# Patient Record
Sex: FEMALE | Race: ASIAN | NOT HISPANIC OR LATINO | Employment: UNEMPLOYED | URBAN - METROPOLITAN AREA
[De-identification: names, ages, dates, MRNs, and addresses within clinical notes are randomized per-mention and may not be internally consistent; named-entity substitution may affect disease eponyms.]

---

## 2019-01-24 ENCOUNTER — OFFICE VISIT (OUTPATIENT)
Dept: FAMILY MEDICINE CLINIC | Facility: CLINIC | Age: 34
End: 2019-01-24

## 2019-01-24 VITALS
HEIGHT: 64 IN | BODY MASS INDEX: 22.43 KG/M2 | HEART RATE: 75 BPM | OXYGEN SATURATION: 99 % | SYSTOLIC BLOOD PRESSURE: 120 MMHG | DIASTOLIC BLOOD PRESSURE: 88 MMHG | WEIGHT: 131.4 LBS | TEMPERATURE: 98.4 F

## 2019-01-24 DIAGNOSIS — Z76.89 ENCOUNTER TO ESTABLISH CARE: Primary | ICD-10-CM

## 2019-01-24 DIAGNOSIS — F40.243 ANXIETY WITH FLYING: ICD-10-CM

## 2019-01-24 DIAGNOSIS — Z00.00 HEALTHCARE MAINTENANCE: ICD-10-CM

## 2019-01-24 DIAGNOSIS — J32.0 MAXILLARY SINUSITIS, UNSPECIFIED CHRONICITY: ICD-10-CM

## 2019-01-24 LAB
ALBUMIN SERPL-MCNC: 4.2 G/DL (ref 3.5–5.2)
ALBUMIN/GLOB SERPL: 1.3 G/DL
ALP SERPL-CCNC: 36 U/L (ref 39–117)
ALT SERPL W P-5'-P-CCNC: 23 U/L (ref 1–33)
ANION GAP SERPL CALCULATED.3IONS-SCNC: 10.4 MMOL/L
AST SERPL-CCNC: 26 U/L (ref 1–32)
BILIRUB SERPL-MCNC: 0.4 MG/DL (ref 0.1–1.2)
BUN BLD-MCNC: 15 MG/DL (ref 6–20)
BUN/CREAT SERPL: 18.3 (ref 7–25)
CALCIUM SPEC-SCNC: 9.5 MG/DL (ref 8.6–10.5)
CHLORIDE SERPL-SCNC: 104 MMOL/L (ref 98–107)
CHOLEST SERPL-MCNC: 163 MG/DL (ref 0–200)
CO2 SERPL-SCNC: 25.6 MMOL/L (ref 22–29)
CREAT BLD-MCNC: 0.82 MG/DL (ref 0.57–1)
ERYTHROCYTE [DISTWIDTH] IN BLOOD BY AUTOMATED COUNT: 12.9 % (ref 4.5–15)
GFR SERPL CREATININE-BSD FRML MDRD: 79 ML/MIN/1.73
GFR SERPL CREATININE-BSD FRML MDRD: 96 ML/MIN/1.73
GLOBULIN UR ELPH-MCNC: 3.2 GM/DL
GLUCOSE BLD-MCNC: 80 MG/DL (ref 65–99)
HCT VFR BLD AUTO: 43.5 % (ref 31–42)
HDLC SERPL-MCNC: 62 MG/DL (ref 40–60)
HGB BLD-MCNC: 13.8 G/DL (ref 12–18)
LDLC SERPL CALC-MCNC: 79 MG/DL (ref 0–100)
LDLC/HDLC SERPL: 1.27 {RATIO}
LYMPHOCYTES # BLD AUTO: 2.1 10*3/MM3 (ref 1.2–3.4)
LYMPHOCYTES NFR BLD AUTO: 24.5 % (ref 21–51)
MCH RBC QN AUTO: 28.2 PG (ref 26.1–33.1)
MCHC RBC AUTO-ENTMCNC: 31.8 G/DL (ref 33–37)
MCV RBC AUTO: 88.9 FL (ref 80–99)
MONOCYTES # BLD AUTO: 0.4 10*3/MM3 (ref 0.1–0.6)
MONOCYTES NFR BLD AUTO: 4.8 % (ref 2–9)
NEUTROPHILS # BLD AUTO: 6 10*3/MM3 (ref 1.4–6.5)
NEUTROPHILS NFR BLD AUTO: 70.7 % (ref 42–75)
PLATELET # BLD AUTO: 338 10*3/MM3 (ref 150–450)
PMV BLD AUTO: 7.7 FL (ref 7.1–10.5)
POTASSIUM BLD-SCNC: 5.1 MMOL/L (ref 3.5–5.2)
PROT SERPL-MCNC: 7.4 G/DL (ref 6–8.5)
RBC # BLD AUTO: 4.9 10*6/MM3 (ref 4–6)
SODIUM BLD-SCNC: 140 MMOL/L (ref 136–145)
TRIGL SERPL-MCNC: 111 MG/DL (ref 0–150)
TSH SERPL DL<=0.05 MIU/L-ACNC: 0.6 MIU/ML (ref 0.27–4.2)
VLDLC SERPL-MCNC: 22.2 MG/DL (ref 5–40)
WBC NRBC COR # BLD: 8.5 10*3/MM3 (ref 4.5–10)

## 2019-01-24 PROCEDURE — 85025 COMPLETE CBC W/AUTO DIFF WBC: CPT | Performed by: NURSE PRACTITIONER

## 2019-01-24 PROCEDURE — 84443 ASSAY THYROID STIM HORMONE: CPT | Performed by: NURSE PRACTITIONER

## 2019-01-24 PROCEDURE — 80061 LIPID PANEL: CPT | Performed by: NURSE PRACTITIONER

## 2019-01-24 PROCEDURE — 80053 COMPREHEN METABOLIC PANEL: CPT | Performed by: NURSE PRACTITIONER

## 2019-01-24 PROCEDURE — 99203 OFFICE O/P NEW LOW 30 MIN: CPT | Performed by: NURSE PRACTITIONER

## 2019-01-24 PROCEDURE — 36415 COLL VENOUS BLD VENIPUNCTURE: CPT | Performed by: NURSE PRACTITIONER

## 2019-01-24 RX ORDER — AMOXICILLIN 875 MG/1
875 TABLET, COATED ORAL 2 TIMES DAILY
Qty: 14 TABLET | Refills: 0 | Status: SHIPPED | OUTPATIENT
Start: 2019-01-24 | End: 2019-01-31

## 2019-01-24 RX ORDER — HYDROXYZINE HYDROCHLORIDE 10 MG/1
10 TABLET, FILM COATED ORAL 3 TIMES DAILY PRN
Qty: 10 TABLET | Refills: 0 | Status: SHIPPED | OUTPATIENT
Start: 2019-01-24 | End: 2021-02-24

## 2019-01-24 RX ORDER — DROSPIRENONE AND ETHINYL ESTRADIOL 0.03MG-3MG
1 KIT ORAL DAILY
COMMUNITY
End: 2021-02-24

## 2019-01-24 RX ORDER — FLUTICASONE PROPIONATE 50 MCG
2 SPRAY, SUSPENSION (ML) NASAL DAILY
Qty: 1 BOTTLE | Refills: 3 | Status: SHIPPED | OUTPATIENT
Start: 2019-01-24 | End: 2021-02-24

## 2019-01-24 NOTE — PROGRESS NOTES
Subjective   Charity Haley is a 33 y.o. female.     History of Present Illness   Here today to establish care, no recent pcp, works in finance, . She does exercise, yoga and running. She is fasting over due for labs.   Taking sydea OCP, she does see GYN, she goes to [place] for this, unsure of GYN, states last pap last year, normal. She does not yet get mammo, she is .   She has allergies, tried claritin, zyrtec, she tried netti pot, flonase, tried sudafed, but oro not help. She tried adrin. She states she has sinus pressure, drainage. She states symptoms started about 2 months ago, she states she gets this during winter she has not yet had abx. Would like to see ENT, wondering about chronic sinusitis. Denies smoking.   She is traveling next month to [place], she has anxiety while on airplanes, she has tried melatonin on planes before, she states not working as much recently. She has never taken anxiety meds before.     The following portions of the patient's history were reviewed and updated as appropriate: allergies, current medications, past family history, past medical history, past social history, past surgical history and problem list.    Review of Systems   Constitutional: Negative for chills, diaphoresis and fever.   HENT: Positive for postnasal drip, rhinorrhea and sinus pressure. Negative for congestion and sore throat.    Respiratory: Negative for cough and shortness of breath.    Cardiovascular: Negative for chest pain.   Musculoskeletal: Negative for arthralgias and myalgias.   Allergic/Immunologic: Positive for environmental allergies.   Neurological: Negative for dizziness, light-headedness and headache.   All other systems reviewed and are negative.      Objective   Physical Exam   Constitutional: She is oriented to person, place, and time. She appears well-developed and well-nourished.   HENT:   Head: Normocephalic.   Right Ear: Tympanic membrane and ear canal normal.   Left Ear: Tympanic  membrane and ear canal normal.   Nose: Mucosal edema and rhinorrhea present. Right sinus exhibits maxillary sinus tenderness. Left sinus exhibits maxillary sinus tenderness.   Mouth/Throat: Uvula is midline, oropharynx is clear and moist and mucous membranes are normal.   Eyes: Pupils are equal, round, and reactive to light.   Neck: Normal range of motion.   Cardiovascular: Normal rate, regular rhythm and normal heart sounds.   Pulmonary/Chest: Effort normal and breath sounds normal. She has no decreased breath sounds. She has no wheezes. She has no rhonchi. She has no rales.   Musculoskeletal: Normal range of motion.   Lymphadenopathy:     She has no cervical adenopathy.   Neurological: She is alert and oriented to person, place, and time.   Skin: Skin is warm and dry.   Psychiatric: She has a normal mood and affect. Her behavior is normal.   Nursing note and vitals reviewed.        Assessment/Plan   Charity was seen today for establish care and nasal congestion.    Diagnoses and all orders for this visit:    Encounter to establish care  -     CBC & Differential  -     Comprehensive Metabolic Panel  -     TSH  -     Lipid panel  -     CBC Auto Differential    Healthcare maintenance  -     CBC & Differential  -     Comprehensive Metabolic Panel  -     TSH  -     Lipid panel  -     CBC Auto Differential    Maxillary sinusitis, unspecified chronicity  -     CBC & Differential  -     Comprehensive Metabolic Panel  -     TSH  -     Lipid panel  -     Ambulatory Referral to ENT (Otolaryngology)  -     CBC Auto Differential    Anxiety with flying    Other orders  -     amoxicillin (AMOXIL) 875 MG tablet; Take 1 tablet by mouth 2 (Two) Times a Day for 7 days.  -     fluticasone (FLONASE) 50 MCG/ACT nasal spray; 2 sprays into the nostril(s) as directed by provider Daily.  -     hydrOXYzine (ATARAX) 10 MG tablet; Take 1 tablet by mouth 3 (Three) Times a Day As Needed for Anxiety.        Start amoxicillin 875mg 2x day for 7  days,   Cont claritin in am, flonase 2 sprays each nostril in pm, daily for about 1-2 weeks then as needed for drainage.   Labs today will call with results.   Cont f/u with GYN as scheduled.   Cont diet and exercise,   Trial hydroxyzine 10mg as needed for anxiety or sleep, educated about sedation, she will try at home before trip and let me know if this helps before flying long trip.  Refer to ENT will call with appt.   Increase fluid intake, get plenty of rest.   Patient agrees with plan of care and understands instructions. Call if worsening symptoms or any problems or concerns.

## 2019-01-24 NOTE — PATIENT INSTRUCTIONS
Start amoxicillin 875mg 2x day for 7 days,   Cont claritin in am, flonase 2 sprays each nostril in pm, daily for about 1-2 weeks then as needed for drainage.   Labs today will call with results.   Cont f/u with GYN as scheduled.   Cont diet and exercise,   Trial hydroxyzine 10mg as needed for anxiety or sleep, educated about sedation, she will try at home before trip and let me know if this helps before flying long trip.  Refer to ENT will call with appt.   Increase fluid intake, get plenty of rest.   Patient agrees with plan of care and understands instructions. Call if worsening symptoms or any problems or concerns.

## 2019-03-20 ENCOUNTER — OFFICE VISIT (OUTPATIENT)
Dept: FAMILY MEDICINE CLINIC | Facility: CLINIC | Age: 34
End: 2019-03-20

## 2019-03-20 VITALS
BODY MASS INDEX: 21.68 KG/M2 | WEIGHT: 127 LBS | OXYGEN SATURATION: 99 % | HEIGHT: 64 IN | HEART RATE: 85 BPM | TEMPERATURE: 98 F | DIASTOLIC BLOOD PRESSURE: 88 MMHG | SYSTOLIC BLOOD PRESSURE: 122 MMHG

## 2019-03-20 DIAGNOSIS — M25.522 LEFT ELBOW PAIN: ICD-10-CM
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
## 2019-03-20 PROCEDURE — 99213 OFFICE O/P EST LOW 20 MIN: CPT | Performed by: NURSE PRACTITIONER

## 2019-03-20 PROCEDURE — 73070 X-RAY EXAM OF ELBOW: CPT | Performed by: NURSE PRACTITIONER

## 2019-03-20 NOTE — PROGRESS NOTES
Subjective   Charity Hlaey is a 33 y.o. female.     History of Present Illness   Here today for elbow pain, she was snowboarding in [place], states a skier ran into her on 3/8/19, did not go to the ER, denies HA, she denies LOC, she was wearing helmet. She states she had knot near lower head, she states she slept a lot last week. She denies jet lag, denies hitting anything with head during fall, she states she fell mostly on snow but tumbled down hill. Denies nausea, HA, confusion, she denies visual changes, dizziness. Denies sleepiness, denies pain.   She also hit left elbow, states she had bruising post fall, she stopped exercise until yesrday. She does still have some left elbow pain, she has pain putting weight on on arm. She denies pain with left.         The following portions of the patient's history were reviewed and updated as appropriate: allergies, current medications, past family history, past medical history, past social history, past surgical history and problem list.    Review of Systems   Constitutional: Negative for chills, diaphoresis and fever.   Respiratory: Negative for cough and shortness of breath.    Cardiovascular: Negative for chest pain.   Musculoskeletal: Positive for arthralgias. Negative for myalgias.   Neurological: Negative for dizziness, syncope, speech difficulty, light-headedness, numbness, headache and memory problem.   All other systems reviewed and are negative.      Objective   Physical Exam   Constitutional: She is oriented to person, place, and time. She appears well-developed and well-nourished.   HENT:   Head: Normocephalic.   Eyes: Conjunctivae and EOM are normal. Pupils are equal, round, and reactive to light.   Neck: Normal range of motion.   Cardiovascular: Normal rate, regular rhythm and normal heart sounds.   Pulmonary/Chest: Effort normal and breath sounds normal.   Musculoskeletal: Normal range of motion.        Left elbow: She exhibits normal range of motion, no  swelling and no effusion. Tenderness found. Medial epicondyle and lateral epicondyle tenderness noted.   Neurological: She is alert and oriented to person, place, and time. She has normal strength. No cranial nerve deficit or sensory deficit. She displays a negative Romberg sign.   Skin: Skin is warm and dry.   Psychiatric: She has a normal mood and affect. Her behavior is normal.   Nursing note and vitals reviewed.  left elbow xray today for pain s/p fall, no comparison shows NAD awaiting radiology over read.         Assessment/Plan   Charity was seen today for elbow pain.    Diagnoses and all orders for this visit:    Injury due to skiing accident, initial encounter    Left elbow pain  -     XR Elbow 2 View Left (In Office)      Normal neuro exam today and HA, sleepiness symptoms resolved, deferred CT head today, she will let me know if symptoms persist.   Elbow xray today will call with results .  Encouraged ice, elevation, fore arm band, if symptoms persist call office will refer to ortho.   Increase fluid intake, get plenty of rest.   Patient agrees with plan of care and understands instructions. Call if worsening symptoms or any problems or concerns.

## 2019-03-20 NOTE — PATIENT INSTRUCTIONS
Normal neuro exam today and HA, sleepiness symptoms resolved, deferred CT head today, she will let me know if symptoms persist.   Elbow xray today will call with results .  Encouraged ice, elevation, fore arm band, if symptoms persist call office will refer to ortho.   Increase fluid intake, get plenty of rest.   Patient agrees with plan of care and understands instructions. Call if worsening symptoms or any problems or concerns.

## 2019-03-27 ENCOUNTER — TELEPHONE (OUTPATIENT)
Dept: FAMILY MEDICINE CLINIC | Facility: CLINIC | Age: 34
End: 2019-03-27

## 2019-03-27 NOTE — TELEPHONE ENCOUNTER
Pt informed    ----- Message from DAYRON Jaffe sent at 3/27/2019  8:37 AM EDT -----  Please call patient with results. Elbow xray is normal,

## 2019-07-17 ENCOUNTER — TELEPHONE (OUTPATIENT)
Dept: FAMILY MEDICINE CLINIC | Facility: CLINIC | Age: 34
End: 2019-07-17

## 2019-07-17 NOTE — TELEPHONE ENCOUNTER
PATIENT WAS REFERRED TO AN ENT AND WASN'T ABLE TO GO AT THE TIME. PATIENT WANTS TO GO NOW FOR HER NOSE

## 2019-08-13 ENCOUNTER — TELEPHONE (OUTPATIENT)
Dept: FAMILY MEDICINE CLINIC | Facility: CLINIC | Age: 34
End: 2019-08-13

## 2019-08-13 NOTE — TELEPHONE ENCOUNTER
PATIENT WOULD LIKE A REFERRAL FOR ENT FOR MAXILLARY SINUSITIS. HAD A REFERRAL BEFORE BUT DIDN'T GO

## 2019-08-14 DIAGNOSIS — J32.0 CHRONIC MAXILLARY SINUSITIS: Primary | ICD-10-CM

## 2021-02-24 ENCOUNTER — OFFICE VISIT (OUTPATIENT)
Dept: FAMILY MEDICINE CLINIC | Facility: CLINIC | Age: 36
End: 2021-02-24

## 2021-02-24 VITALS
OXYGEN SATURATION: 98 % | HEIGHT: 64 IN | SYSTOLIC BLOOD PRESSURE: 118 MMHG | DIASTOLIC BLOOD PRESSURE: 78 MMHG | BODY MASS INDEX: 21.21 KG/M2 | WEIGHT: 124.2 LBS | HEART RATE: 88 BPM | TEMPERATURE: 98 F

## 2021-02-24 DIAGNOSIS — J30.9 ALLERGIC RHINITIS, UNSPECIFIED SEASONALITY, UNSPECIFIED TRIGGER: ICD-10-CM

## 2021-02-24 DIAGNOSIS — Z00.00 WELL FEMALE EXAM WITHOUT GYNECOLOGICAL EXAM: Primary | ICD-10-CM

## 2021-02-24 DIAGNOSIS — I10 ESSENTIAL HYPERTENSION: ICD-10-CM

## 2021-02-24 PROCEDURE — 99395 PREV VISIT EST AGE 18-39: CPT | Performed by: NURSE PRACTITIONER

## 2021-02-24 RX ORDER — AZELASTINE HYDROCHLORIDE, FLUTICASONE PROPIONATE 137; 50 UG/1; UG/1
1 SPRAY, METERED NASAL 2 TIMES DAILY
Qty: 23 G | Refills: 0 | Status: SHIPPED | OUTPATIENT
Start: 2021-02-24 | End: 2021-03-01

## 2021-02-24 RX ORDER — DROSPIRENONE AND ETHINYL ESTRADIOL 0.03MG-3MG
1 KIT ORAL DAILY
COMMUNITY

## 2021-02-24 RX ORDER — LABETALOL 200 MG/1
TABLET, FILM COATED ORAL
COMMUNITY
Start: 2021-02-09 | End: 2021-02-24 | Stop reason: SINTOL

## 2021-02-24 RX ORDER — CLONIDINE HYDROCHLORIDE 0.1 MG/1
0.1 TABLET ORAL 2 TIMES DAILY PRN
Qty: 180 TABLET | Refills: 0 | Status: SHIPPED | OUTPATIENT
Start: 2021-02-24 | End: 2021-08-05

## 2021-02-24 NOTE — PROGRESS NOTES
Subjective   Charity Haley is a 35 y.o. female.     Chief Complaint   Patient presents with   • Hypertension     This is my first time seeing this patient.  History of Present Illness   The patient is being seen for a health maintenance evaluation.  The last health maintenance visit is unknown.  Social history: Household members include boyfriend.  She is .  Work status: Full-time.  The patient has never smoked cigarettes.  She reports occasional alcohol use.  She has never used illicit drugs.  General health: The patient's health is described as good.  She has regular dental visits.  The patient brushes 2 times a day, flosses daily and reports her last dental visit was less than 6 months ago.  She denies vision problems.  Vision care includes contact lenses and no recent eye examination.  She denies hearing loss.  Immunization status: Influenza vaccination needed.  Lifestyle: She exercises regularly.  Reproductive health: She is sexually active.  Screening: Pap smear performed in 2019.    The following portions of the patient's history were reviewed and updated as appropriate: allergies, current medications, past family history, past medical history, past social history, past surgical history and problem list.    History reviewed. No pertinent past medical history.    Past Surgical History:   Procedure Laterality Date   •  SECTION         Family History   Problem Relation Age of Onset   • No Known Problems Mother    • Heart disease Father    • Hypertension Sister    • No Known Problems Brother        Social History     Socioeconomic History   • Marital status:      Spouse name: Not on file   • Number of children: Not on file   • Years of education: Not on file   • Highest education level: Not on file   Tobacco Use   • Smoking status: Never Smoker   • Smokeless tobacco: Never Used   Substance and Sexual Activity   • Alcohol use: Yes     Alcohol/week: 1.0 standard drinks     Types: 1 Glasses  "of wine per week     Frequency: Monthly or less     Drinks per session: 1 or 2   • Drug use: Defer   • Sexual activity: Never       Review of Systems   Constitutional: Negative for fever.   HENT: Negative for ear pain, rhinorrhea and sore throat.    Eyes: Negative for visual disturbance.   Respiratory: Negative for cough and shortness of breath.    Cardiovascular: Negative for chest pain and leg swelling.   Gastrointestinal: Negative for abdominal pain, diarrhea, nausea and vomiting.   Genitourinary: Negative.    Musculoskeletal: Negative.    Skin: Negative for rash.   Neurological: Negative for dizziness and headache.   Psychiatric/Behavioral: Negative for depressed mood.       Objective   Vitals:    02/24/21 1327   BP: 118/78   Pulse: 88   Temp: 98 °F (36.7 °C)   TempSrc: Temporal   SpO2: 98%   Weight: 56.3 kg (124 lb 3.2 oz)   Height: 162.6 cm (64\")      Body mass index is 21.32 kg/m².  Physical Exam  Vitals signs and nursing note reviewed.   Constitutional:       Appearance: Normal appearance.   HENT:      Head: Normocephalic and atraumatic.      Right Ear: Tympanic membrane and ear canal normal.      Left Ear: Tympanic membrane and ear canal normal.   Eyes:      Conjunctiva/sclera: Conjunctivae normal.      Pupils: Pupils are equal, round, and reactive to light.   Neck:      Musculoskeletal: Neck supple.   Cardiovascular:      Rate and Rhythm: Normal rate and regular rhythm.      Heart sounds: Normal heart sounds.   Pulmonary:      Effort: Pulmonary effort is normal.      Breath sounds: Normal breath sounds.   Abdominal:      General: Bowel sounds are normal.      Palpations: Abdomen is soft.      Tenderness: There is no abdominal tenderness.   Genitourinary:     Comments: Deferred   Musculoskeletal: Normal range of motion.   Skin:     General: Skin is warm and dry.   Neurological:      Mental Status: She is alert and oriented to person, place, and time.   Psychiatric:         Mood and Affect: Mood normal. "           Assessment/Plan   Diagnoses and all orders for this visit:    1. Well female exam without gynecological exam (Primary)    2. Essential hypertension  -     cloNIDine (Catapres) 0.1 MG tablet; Take 1 tablet by mouth 2 (Two) Times a Day As Needed for High Blood Pressure.  Dispense: 180 tablet; Refill: 0    3. Allergic rhinitis, unspecified seasonality, unspecified trigger  -     Azelastine-Fluticasone (Dymista) 137-50 MCG/ACT suspension; 1 spray into the nostril(s) as directed by provider 2 (two) times a day.  Dispense: 23 g; Refill: 0    Impression: Currently, she has an adequate exercise regimen. Cervical cancer screening is current. No screening lab work is due at this time. Patient declines influenza vaccination at this time. Advice and education were given regarding diet.

## 2021-02-26 ENCOUNTER — TELEPHONE (OUTPATIENT)
Dept: FAMILY MEDICINE CLINIC | Facility: CLINIC | Age: 36
End: 2021-02-26

## 2021-02-26 NOTE — TELEPHONE ENCOUNTER
Please let patient know I sent over Dymista which combines azelastine and fluticasone nasal sprays. Since insurance will not cover the combination of these sprays I can send over each of these nasal sprays separately.

## 2021-02-26 NOTE — TELEPHONE ENCOUNTER
PATIENT STATED SHE WAS PRESCRIBED TWO MEDICATIONS YESTERDAY . PATIENT STATED THIS MEDICATION WAS FOR HER NOSE. (UNSURE OF NAME) BUT WAS NOT PAID FOR BY HER INSURANCE. PATIENT IS REQUESTING A SUBSTITUTE FOR THIS MEDICATION. PLEASE ADVISE FOR FURTHER DETAILS.     CALLBACK NUMBER [phone]

## 2021-03-01 RX ORDER — AZELASTINE 1 MG/ML
2 SPRAY, METERED NASAL 2 TIMES DAILY
Qty: 30 ML | Refills: 1 | Status: SHIPPED | OUTPATIENT
Start: 2021-03-01 | End: 2021-08-05

## 2021-03-01 RX ORDER — FLUTICASONE PROPIONATE 50 MCG
2 SPRAY, SUSPENSION (ML) NASAL DAILY
Qty: 16 G | Refills: 1 | Status: SHIPPED | OUTPATIENT
Start: 2021-03-01 | End: 2021-08-05

## 2021-08-05 ENCOUNTER — OFFICE VISIT (OUTPATIENT)
Dept: FAMILY MEDICINE CLINIC | Facility: CLINIC | Age: 36
End: 2021-08-05

## 2021-08-05 VITALS
BODY MASS INDEX: 19.02 KG/M2 | DIASTOLIC BLOOD PRESSURE: 80 MMHG | HEART RATE: 90 BPM | TEMPERATURE: 98.4 F | HEIGHT: 64 IN | WEIGHT: 111.4 LBS | SYSTOLIC BLOOD PRESSURE: 134 MMHG | OXYGEN SATURATION: 98 %

## 2021-08-05 DIAGNOSIS — F41.9 ANXIETY: Primary | ICD-10-CM

## 2021-08-05 PROCEDURE — 99213 OFFICE O/P EST LOW 20 MIN: CPT | Performed by: NURSE PRACTITIONER

## 2021-08-05 NOTE — PROGRESS NOTES
"Chief Complaint  Anxiety    Subjective          Charity Haley presents to [facility]  History of Present Illness  Anxiety: The patient reports doing poorly. Symptoms: racing thoughts. Significant family history: postpartum depression. Medication(s): none. Patient is exercising. Patient does not use tobacco.     Objective   Vital Signs:   /80 (BP Location: Right arm, Patient Position: Sitting, Cuff Size: Adult)   Pulse 90   Temp 98.4 °F (36.9 °C) (Temporal)   Ht 162.6 cm (64\")   Wt 50.5 kg (111 lb 6.4 oz)   SpO2 98%   BMI 19.12 kg/m²     Physical Exam  Vitals and nursing note reviewed.   Constitutional:       Appearance: Normal appearance.   Cardiovascular:      Rate and Rhythm: Normal rate and regular rhythm.      Heart sounds: Normal heart sounds.   Pulmonary:      Effort: Pulmonary effort is normal.      Breath sounds: Normal breath sounds.   Neurological:      Mental Status: She is alert and oriented to person, place, and time.   Psychiatric:         Mood and Affect: Mood normal.        Result Review :            Assessment and Plan    Diagnoses and all orders for this visit:    1. Anxiety (Primary)  Comments:  - Risks and benefits of medication discussed with patient.  - ER if any SI/HI.  Orders:  -     sertraline (Zoloft) 50 MG tablet; TAKE 1/2 TABLET DAILY X 1 WEEK, THEN TAKE 1 TABLET DAILY  Dispense: 90 tablet; Refill: 0      I spent 20 minutes caring for Charity on this date of service. This time includes time spent by me in the following activities:performing a medically appropriate examination and/or evaluation , counseling and educating the patient/family/caregiver, ordering medications, tests, or procedures and documenting information in the medical record  Follow Up   Return in about 6 weeks (around 9/16/2021) for Recheck.  Patient was given instructions and counseling regarding her condition or for health maintenance advice. Please see specific information " pulled into the AVS if appropriate.

## 2021-09-17 ENCOUNTER — OFFICE VISIT (OUTPATIENT)
Dept: FAMILY MEDICINE CLINIC | Facility: CLINIC | Age: 36
End: 2021-09-17

## 2021-09-17 VITALS
WEIGHT: 112 LBS | SYSTOLIC BLOOD PRESSURE: 138 MMHG | HEIGHT: 64 IN | HEART RATE: 79 BPM | TEMPERATURE: 98.4 F | DIASTOLIC BLOOD PRESSURE: 86 MMHG | OXYGEN SATURATION: 100 % | BODY MASS INDEX: 19.12 KG/M2

## 2021-09-17 DIAGNOSIS — F41.9 ANXIETY: ICD-10-CM

## 2021-09-17 PROCEDURE — 99212 OFFICE O/P EST SF 10 MIN: CPT | Performed by: NURSE PRACTITIONER

## 2021-09-20 NOTE — PROGRESS NOTES
"Chief Complaint  Anxiety    Subjective          Charity Haley presents to [facility]  History of Present Illness  Anxiety: The patient reports doing well. Symptoms: none. The patient is adherent to their medication regimen. Medication(s): sertraline .   Objective   Vital Signs:   /86 (BP Location: Left arm, Patient Position: Sitting, Cuff Size: Adult)   Pulse 79   Temp 98.4 °F (36.9 °C) (Temporal)   Ht 162.6 cm (64\")   Wt 50.8 kg (112 lb)   SpO2 100%   BMI 19.22 kg/m²     Physical Exam  Vitals and nursing note reviewed.   Constitutional:       Appearance: Normal appearance.   Cardiovascular:      Rate and Rhythm: Normal rate and regular rhythm.      Heart sounds: Normal heart sounds.   Pulmonary:      Effort: Pulmonary effort is normal.      Breath sounds: Normal breath sounds.   Neurological:      Mental Status: She is alert and oriented to person, place, and time.   Psychiatric:         Mood and Affect: Mood normal.        Result Review :            Assessment and Plan    Diagnoses and all orders for this visit:    1. Anxiety  Comments:  - ER if any SI/HI.  Orders:  -     sertraline (Zoloft) 50 MG tablet; Take 1 tablet by mouth Daily.  Dispense: 90 tablet; Refill: 1      I spent 15 minutes caring for Charity on this date of service. This time includes time spent by me in the following activities:performing a medically appropriate examination and/or evaluation , counseling and educating the patient/family/caregiver, ordering medications, tests, or procedures and documenting information in the medical record  Follow Up   Return in about 6 months (around 3/17/2022) for Recheck.  Patient was given instructions and counseling regarding her condition or for health maintenance advice. Please see specific information pulled into the AVS if appropriate.       "

## 2021-10-05 ENCOUNTER — TELEPHONE (OUTPATIENT)
Dept: FAMILY MEDICINE CLINIC | Facility: CLINIC | Age: 36
End: 2021-10-05

## 2021-10-05 NOTE — TELEPHONE ENCOUNTER
THE PATIENT WOULD LIKE TO KNOW IF SHE CAN BUMP UP HER DOSAGE OF THE sertraline (Zoloft) 50 MG tablet. SHE FEELS AS THOUGH THIS WOULD WORK BETTER AT A HIGHER DOSE. PLEASE ADVISE BY CALLING [phone].

## 2021-12-20 ENCOUNTER — TELEMEDICINE (OUTPATIENT)
Dept: FAMILY MEDICINE CLINIC | Facility: CLINIC | Age: 36
End: 2021-12-20

## 2021-12-20 ENCOUNTER — TELEPHONE (OUTPATIENT)
Dept: FAMILY MEDICINE CLINIC | Facility: CLINIC | Age: 36
End: 2021-12-20

## 2021-12-20 DIAGNOSIS — J01.00 ACUTE NON-RECURRENT MAXILLARY SINUSITIS: Primary | ICD-10-CM

## 2021-12-20 PROCEDURE — 99213 OFFICE O/P EST LOW 20 MIN: CPT | Performed by: NURSE PRACTITIONER

## 2021-12-20 RX ORDER — AMOXICILLIN AND CLAVULANATE POTASSIUM 875; 125 MG/1; MG/1
1 TABLET, FILM COATED ORAL 2 TIMES DAILY
Qty: 14 TABLET | Refills: 0 | Status: SHIPPED | OUTPATIENT
Start: 2021-12-20 | End: 2021-12-27

## 2021-12-20 RX ORDER — FLUTICASONE PROPIONATE 50 MCG
2 SPRAY, SUSPENSION (ML) NASAL DAILY
Qty: 18.2 ML | Refills: 1 | Status: SHIPPED | OUTPATIENT
Start: 2021-12-20 | End: 2022-04-29

## 2021-12-20 NOTE — PROGRESS NOTES
Subjective   Charity Haley is a 36 y.o. female.     Chief Complaint   Patient presents with   • Sinusitis     You have chosen to receive care through a telehealth visit.  Do you consent to use a video/audio connection for your medical care today? Yes    This was an audio and video enabled telemedicine encounter using [employer].      History of Present Illness   Patient of Miesha Hernandez and is new to me; patient presents with c/o sinus symptoms; has had cold symptoms for about 1 week; daughter had cold recently; had negative COVID-19 home test on 21 and had negative PCR test at [facility] on 21; symptoms have worsened with cough and increase in congestion over last couple of days; has had some dizziness; has pressure on left side of face and feels swollen; has had some discomfort in teeth; hard to breathe through nose and hard to sleep; has tried Mucinex sinus max and has not seemed to help much; productive cough; has nasal stuffiness; tried Neti pot and helped a little; no fever; has had fatigue; has had some loss of smell at times due to nasal congestion; has had COVID-19 vaccines.    Has not taken Sertraline in the last week and has been feeling fine off medication, so is staying off medication for now.    The following portions of the patient's history were reviewed and updated as appropriate: allergies, current medications, past family history, past medical history, past social history, past surgical history and problem list.    Current Outpatient Medications on File Prior to Visit   Medication Sig   • drospirenone-ethinyl estradiol (REEMA,OCELLA) 3-0.03 MG per tablet Take 1 tablet by mouth Daily.   • sertraline (Zoloft) 50 MG tablet Take 1 tablet by mouth Daily.     No current facility-administered medications on file prior to visit.        History reviewed. No pertinent past medical history.    Past Surgical History:   Procedure Laterality Date   •  SECTION         Family History   Problem  Relation Age of Onset   • No Known Problems Mother    • Heart disease Father    • Hypertension Sister    • No Known Problems Brother        Social History     Socioeconomic History   • Marital status:    Tobacco Use   • Smoking status: Never Smoker   • Smokeless tobacco: Never Used   Substance and Sexual Activity   • Alcohol use: Yes     Alcohol/week: 1.0 standard drink     Types: 1 Glasses of wine per week   • Drug use: Defer   • Sexual activity: Never       Review of Systems   Constitutional: Positive for fatigue. Negative for appetite change, chills, fever, unexpected weight gain and unexpected weight loss.   HENT: Positive for postnasal drip and sinus pressure. Negative for ear pain and trouble swallowing. Sore throat: some at first, has improved.    Eyes: Negative for blurred vision.   Respiratory: Positive for cough. Negative for shortness of breath. Chest tightness: some at times, no history asthma.    Gastrointestinal: Negative for abdominal pain, diarrhea, nausea and vomiting.   Musculoskeletal: Negative for back pain. Arthralgias: some body aches.   Skin: Negative for rash.   Neurological: Positive for headache. Dizziness: see HPI.       Objective   There were no vitals filed for this visit.  There is no height or weight on file to calculate BMI.    Physical Exam  Constitutional:       General: She is not in acute distress.     Appearance: She is well-developed. She is not diaphoretic. Ill appearance: mild.   HENT:      Head: Normocephalic.      Nose:      Right Sinus: No maxillary sinus tenderness or frontal sinus tenderness.      Left Sinus: Maxillary sinus tenderness present. No frontal sinus tenderness (per patient palpation).   Eyes:      Conjunctiva/sclera: Conjunctivae normal.   Pulmonary:      Effort: Pulmonary effort is normal. No accessory muscle usage or respiratory distress.   Abdominal:      Tenderness: There is no abdominal tenderness (per patient palpation).   Musculoskeletal:       Cervical back: Normal range of motion and neck supple.   Neurological:      Mental Status: She is alert and oriented to person, place, and time.   Psychiatric:         Mood and Affect: Mood normal.         Thought Content: Thought content normal.         Cognition and Memory: Cognition normal.         Judgment: Judgment normal.         Lab Results   Component Value Date    WBC 8.50 01/24/2019    RBC 4.90 01/24/2019    HGB 13.8 01/24/2019    HCT 43.5 (H) 01/24/2019    MCV 88.9 01/24/2019    MCH 28.2 01/24/2019    MCHC 31.8 (L) 01/24/2019    RDW 12.9 01/24/2019    MPV 7.7 01/24/2019     01/24/2019    NEUTRORELPCT 70.7 01/24/2019    LYMPHORELPCT 24.5 01/24/2019    MONORELPCT 4.8 01/24/2019    NEUTROABS 6.00 01/24/2019    LYMPHSABS 2.10 01/24/2019    MONOSABS 0.40 01/24/2019     Lab Results   Component Value Date    GLUCOSE 80 01/24/2019    BUN 15 01/24/2019    CREATININE 0.82 01/24/2019    EGFRIFNONA 79 01/24/2019    EGFRIFAFRI 96 01/24/2019    BCR 18.3 01/24/2019    K 5.1 01/24/2019    CO2 25.6 01/24/2019    CALCIUM 9.5 01/24/2019    ALBUMIN 4.20 01/24/2019    AST 26 01/24/2019    ALT 23 01/24/2019      Lab Results   Component Value Date    TRIG 111 01/24/2019    HDL 62 (H) 01/24/2019    VLDL 22.2 01/24/2019    LDL 79 01/24/2019     Lab Results   Component Value Date    TSH 0.599 01/24/2019         Assessment/Plan .  Problem List Items Addressed This Visit     Acute non-recurrent maxillary sinusitis - Primary    Current Assessment & Plan     Increase intake of clear liquids and rest.  Try nasal steroid, such as Flonase or Nasacort.  May continue Mucinex to thin secretions.         Relevant Medications    amoxicillin-clavulanate (Augmentin) 875-125 MG per tablet    fluticasone (Flonase) 50 MCG/ACT nasal spray          Return if symptoms worsen or fail to improve.   Discussed potential for Augmentin to interact with OCP and to use back up method; discussed possible side effects with Augmentin.    Time spent doing  video visit, reviewing, discussing, answering questions, and educating patient was 20 minutes.

## 2021-12-20 NOTE — ASSESSMENT & PLAN NOTE
Increase intake of clear liquids and rest.  Try nasal steroid, such as Flonase or Nasacort.  May continue Mucinex to thin secretions.

## 2021-12-20 NOTE — PATIENT INSTRUCTIONS
Increase intake of clear liquids and rest.  Try nasal steroid, such as Flonase or Nasacort.  May continue Mucinex to thin secretions.  Follow up if symptoms persist or worsen.

## 2022-01-14 ENCOUNTER — OFFICE VISIT (OUTPATIENT)
Dept: FAMILY MEDICINE CLINIC | Facility: CLINIC | Age: 37
End: 2022-01-14

## 2022-01-14 VITALS
TEMPERATURE: 98.4 F | DIASTOLIC BLOOD PRESSURE: 90 MMHG | BODY MASS INDEX: 20.63 KG/M2 | WEIGHT: 120.2 LBS | HEART RATE: 76 BPM | OXYGEN SATURATION: 99 % | SYSTOLIC BLOOD PRESSURE: 130 MMHG

## 2022-01-14 DIAGNOSIS — R35.0 FREQUENCY OF URINATION: Primary | ICD-10-CM

## 2022-01-14 LAB
B-HCG UR QL: NEGATIVE
BILIRUB BLD-MCNC: ABNORMAL MG/DL
CLARITY, POC: CLEAR
COLOR UR: YELLOW
EXPIRATION DATE: ABNORMAL
EXPIRATION DATE: NORMAL
GLUCOSE UR STRIP-MCNC: NEGATIVE MG/DL
INTERNAL NEGATIVE CONTROL: NORMAL
INTERNAL POSITIVE CONTROL: NORMAL
KETONES UR QL: NEGATIVE
LEUKOCYTE EST, POC: ABNORMAL
Lab: ABNORMAL
Lab: NORMAL
NITRITE UR-MCNC: NEGATIVE MG/ML
PH UR: 6 [PH] (ref 5–8)
PROT UR STRIP-MCNC: ABNORMAL MG/DL
RBC # UR STRIP: NEGATIVE /UL
SP GR UR: 1.03 (ref 1–1.03)
UROBILINOGEN UR QL: NORMAL

## 2022-01-14 PROCEDURE — 81003 URINALYSIS AUTO W/O SCOPE: CPT | Performed by: NURSE PRACTITIONER

## 2022-01-14 PROCEDURE — 81025 URINE PREGNANCY TEST: CPT | Performed by: NURSE PRACTITIONER

## 2022-01-14 PROCEDURE — 99212 OFFICE O/P EST SF 10 MIN: CPT | Performed by: NURSE PRACTITIONER

## 2022-01-14 RX ORDER — NITROFURANTOIN 25; 75 MG/1; MG/1
100 CAPSULE ORAL 2 TIMES DAILY
Qty: 14 CAPSULE | Refills: 0 | Status: SHIPPED | OUTPATIENT
Start: 2022-01-14 | End: 2022-01-21

## 2022-01-14 NOTE — PROGRESS NOTES
Chief Complaint  Urinary Frequency and Abdominal Pain (cramping)    Subjective          Charity Haley presents to [facility]  Urinary Frequency   This is a new problem. The current episode started in the past 7 days. There has been no fever. She is sexually active. Associated symptoms include frequency and a possible pregnancy. Pertinent negatives include no discharge, flank pain, hematuria or nausea. She has tried increased fluids for the symptoms. The treatment provided no relief.     Objective   Vital Signs:   /90 (BP Location: Left arm, Patient Position: Sitting, Cuff Size: Adult)   Pulse 76   Temp 98.4 °F (36.9 °C) (Temporal)   Wt 54.5 kg (120 lb 3.2 oz)   SpO2 99%   BMI 20.63 kg/m²     Physical Exam  Vitals and nursing note reviewed.   Constitutional:       Appearance: Normal appearance.   Cardiovascular:      Rate and Rhythm: Normal rate and regular rhythm.      Heart sounds: Normal heart sounds.   Pulmonary:      Effort: Pulmonary effort is normal.      Breath sounds: Normal breath sounds.   Abdominal:      General: Bowel sounds are normal.      Palpations: Abdomen is soft.      Tenderness: There is no abdominal tenderness. There is no right CVA tenderness or left CVA tenderness.   Neurological:      Mental Status: She is alert and oriented to person, place, and time.   Psychiatric:         Mood and Affect: Mood normal.        Result Review :   The following data was reviewed by: DAYRON Velázquez on 01/14/2022:  UA    Urinalysis 1/14/22   Ketones, UA Negative   Leukocytes, UA Moderate (2+) (A)   (A) Abnormal value                   Assessment and Plan    Diagnoses and all orders for this visit:    1. Frequency of urination (Primary)  -     POCT urinalysis dipstick, automated  -     POCT pregnancy, urine  -     Urine Culture - Urine, Urine, Clean Catch  -     nitrofurantoin, macrocrystal-monohydrate, (Macrobid) 100 MG capsule; Take 1 capsule by mouth 2 (Two)  Times a Day for 7 days.  Dispense: 14 capsule; Refill: 0      I spent 15 minutes caring for Charity on this date of service. This time includes time spent by me in the following activities:reviewing tests, performing a medically appropriate examination and/or evaluation , counseling and educating the patient/family/caregiver, ordering medications, tests, or procedures and documenting information in the medical record  Follow Up   Return if symptoms worsen or fail to improve.  Patient was given instructions and counseling regarding her condition or for health maintenance advice. Please see specific information pulled into the AVS if appropriate.

## 2022-01-18 LAB
BACTERIA UR CULT: ABNORMAL
BACTERIA UR CULT: ABNORMAL
OTHER ANTIBIOTIC SUSC ISLT: ABNORMAL

## 2022-01-19 ENCOUNTER — TELEPHONE (OUTPATIENT)
Dept: FAMILY MEDICINE CLINIC | Facility: CLINIC | Age: 37
End: 2022-01-19

## 2022-01-19 NOTE — TELEPHONE ENCOUNTER
[facility]    **HUB/** MAY RELAY MESSAGE          ----- Message from DAYRON Velázquez sent at 1/18/2022  7:49 AM EST -----  Please inform patient that urine culture shows infection susceptible to nitrofurantoin so complete antibiotic as directed and follow-up if symptoms persist or worsen.

## 2022-01-19 NOTE — TELEPHONE ENCOUNTER
Caller: Charity Haley    Relationship: Self    Best call back number: [phone]    What is the best time to reach you: ANYTIME     Who are you requesting to speak with (clinical staff, provider,  specific staff member):     Do you know the name of the person who called: BALDOMERO    What was the call regarding: HUB READ THE MESSAGE TO THE PATIENT. PATIENT HAS ADDITIONAL QUESTIONS. SHE WOULD LIKE TO KNOW WHAT TYPE OF INFECTION SHE HAS, WHAT MEDICATION SHE IS TAKING, AND HOW LONG.     Do you require a callback: YES

## 2022-04-29 ENCOUNTER — OFFICE VISIT (OUTPATIENT)
Dept: FAMILY MEDICINE CLINIC | Facility: CLINIC | Age: 37
End: 2022-04-29

## 2022-04-29 VITALS
WEIGHT: 130.6 LBS | SYSTOLIC BLOOD PRESSURE: 130 MMHG | BODY MASS INDEX: 22.42 KG/M2 | HEART RATE: 93 BPM | OXYGEN SATURATION: 99 % | TEMPERATURE: 98.2 F | DIASTOLIC BLOOD PRESSURE: 100 MMHG

## 2022-04-29 DIAGNOSIS — Z00.00 WELL FEMALE EXAM WITHOUT GYNECOLOGICAL EXAM: Primary | ICD-10-CM

## 2022-04-29 DIAGNOSIS — R03.0 ELEVATED BLOOD PRESSURE READING WITHOUT DIAGNOSIS OF HYPERTENSION: ICD-10-CM

## 2022-04-29 DIAGNOSIS — R19.00 MASS OF SOFT TISSUE OF ABDOMEN: ICD-10-CM

## 2022-04-29 DIAGNOSIS — R35.0 FREQUENCY OF URINATION: ICD-10-CM

## 2022-04-29 LAB
BILIRUB BLD-MCNC: NEGATIVE MG/DL
CLARITY, POC: CLEAR
COLOR UR: YELLOW
EXPIRATION DATE: ABNORMAL
GLUCOSE UR STRIP-MCNC: NEGATIVE MG/DL
KETONES UR QL: NEGATIVE
LEUKOCYTE EST, POC: NEGATIVE
Lab: ABNORMAL
NITRITE UR-MCNC: NEGATIVE MG/ML
PH UR: 6 [PH] (ref 5–8)
PROT UR STRIP-MCNC: ABNORMAL MG/DL
RBC # UR STRIP: ABNORMAL /UL
SP GR UR: 1.02 (ref 1–1.03)
UROBILINOGEN UR QL: NORMAL

## 2022-04-29 PROCEDURE — 81003 URINALYSIS AUTO W/O SCOPE: CPT | Performed by: NURSE PRACTITIONER

## 2022-04-29 PROCEDURE — 99395 PREV VISIT EST AGE 18-39: CPT | Performed by: NURSE PRACTITIONER

## 2022-04-29 NOTE — PROGRESS NOTES
Chief Complaint  Urinary Frequency    Cornelio Haley presents to [facility]  Urinary Frequency   This is a new problem. The current episode started in the past 7 days. The problem has been gradually improving. There has been no fever. She is sexually active. Associated symptoms include frequency. Pertinent negatives include no discharge, hematuria, nausea or vomiting. Treatments tried: Monistat.  The treatment provided mild relief.     The patient is being seen for a health maintenance evaluation.  Last health maintenance visit was 1 year ago.  Social history: Household members include boyfriend.  She is .  Work status: Full-time.  The patient has never smoked cigarettes.  She reports occasional alcohol use.  She has never used illicit drugs.  General health: The patient's health is described as good.  She has regular dental visits.  The patient brushes 2 times a day, flosses daily and reports her last dental visit was less than 6 months ago.  She denies vision problems.  Vision care includes contact lenses and no recent eye exam.  She denies hearing loss.  Immunization status: Up-to-date.  Lifestyle: She exercises regularly.  Reproductive health: She is sexually active.  Screening: Pap smear performed in 2019.    Objective   Vital Signs:   /100 (BP Location: Right arm, Patient Position: Sitting, Cuff Size: Adult)   Pulse 93   Temp 98.2 °F (36.8 °C) (Temporal)   Wt 59.2 kg (130 lb 9.6 oz)   SpO2 99%   BMI 22.42 kg/m²     Physical Exam  Vitals and nursing note reviewed.   Constitutional:       Appearance: Normal appearance.   HENT:      Head: Normocephalic and atraumatic.      Right Ear: Tympanic membrane and ear canal normal.      Left Ear: Tympanic membrane and ear canal normal.   Eyes:      Conjunctiva/sclera: Conjunctivae normal.      Pupils: Pupils are equal, round, and reactive to light.   Cardiovascular:      Rate and Rhythm: Normal rate and  regular rhythm.      Heart sounds: Normal heart sounds.   Pulmonary:      Effort: Pulmonary effort is normal.      Breath sounds: Normal breath sounds.   Abdominal:      General: Bowel sounds are normal.      Palpations: Abdomen is soft. There is mass (small, firm nodule within deep tissue of left upper quadrant that is nontender).      Tenderness: There is no abdominal tenderness.   Genitourinary:     Comments: Deferred   Musculoskeletal:         General: Normal range of motion.      Cervical back: Neck supple.   Skin:     General: Skin is warm and dry.   Neurological:      Mental Status: She is alert and oriented to person, place, and time.   Psychiatric:         Mood and Affect: Mood normal.        Result Review :   The following data was reviewed by: DAYRON Velázquez on 04/29/2022:  UA    Urinalysis 1/14/22 4/29/22   Ketones, UA Negative Negative   Leukocytes, UA Moderate (2+) (A) Negative   (A) Abnormal value                   Assessment and Plan    Diagnoses and all orders for this visit:    1. Well female exam without gynecological exam (Primary)  -     Lipid Panel With / Chol / HDL Ratio  -     Comprehensive Metabolic Panel  -     CBC & Differential    2. Frequency of urination  -     POCT urinalysis dipstick, automated  -     Urine Culture - Urine, Urine, Clean Catch    3. Elevated blood pressure reading without diagnosis of hypertension  Comments:  - Patient will monitor blood pressure at home.    4. Mass of soft tissue of abdomen  -     US Abdomen Complete; Future      BMI has not been calculated during today's encounter.     Impression: Currently, she has an adequate exercise regimen.  Cervical cancer screening is managed by gynecology.  Screening lab work includes hemoglobin, glucose and lipid panel.  Vaccinations are up-to-date.  Advice and education were given regarding diet.       Follow Up   Return in about 1 year (around 4/29/2023) for Annual physical.  Patient was given instructions and  counseling regarding her condition or for health maintenance advice. Please see specific information pulled into the AVS if appropriate.

## 2022-04-30 LAB
ALBUMIN SERPL-MCNC: 4.4 G/DL (ref 3.8–4.8)
ALBUMIN/GLOB SERPL: 1.6 {RATIO} (ref 1.2–2.2)
ALP SERPL-CCNC: 54 IU/L (ref 44–121)
ALT SERPL-CCNC: 11 IU/L (ref 0–32)
AST SERPL-CCNC: 16 IU/L (ref 0–40)
BASOPHILS # BLD AUTO: 0.1 X10E3/UL (ref 0–0.2)
BASOPHILS NFR BLD AUTO: 1 %
BILIRUB SERPL-MCNC: 0.7 MG/DL (ref 0–1.2)
BUN SERPL-MCNC: 14 MG/DL (ref 6–20)
BUN/CREAT SERPL: 20 (ref 9–23)
CALCIUM SERPL-MCNC: 9.3 MG/DL (ref 8.7–10.2)
CHLORIDE SERPL-SCNC: 101 MMOL/L (ref 96–106)
CHOLEST SERPL-MCNC: 194 MG/DL (ref 100–199)
CHOLEST/HDLC SERPL: 3.3 RATIO (ref 0–4.4)
CO2 SERPL-SCNC: 24 MMOL/L (ref 20–29)
CREAT SERPL-MCNC: 0.71 MG/DL (ref 0.57–1)
EGFRCR SERPLBLD CKD-EPI 2021: 112 ML/MIN/1.73
EOSINOPHIL # BLD AUTO: 0.5 X10E3/UL (ref 0–0.4)
EOSINOPHIL NFR BLD AUTO: 7 %
ERYTHROCYTE [DISTWIDTH] IN BLOOD BY AUTOMATED COUNT: 12.4 % (ref 11.7–15.4)
GLOBULIN SER CALC-MCNC: 2.7 G/DL (ref 1.5–4.5)
GLUCOSE SERPL-MCNC: 91 MG/DL (ref 65–99)
HCT VFR BLD AUTO: 40.9 % (ref 34–46.6)
HDLC SERPL-MCNC: 59 MG/DL
HGB BLD-MCNC: 13.7 G/DL (ref 11.1–15.9)
IMM GRANULOCYTES # BLD AUTO: 0 X10E3/UL (ref 0–0.1)
IMM GRANULOCYTES NFR BLD AUTO: 0 %
LDLC SERPL CALC-MCNC: 116 MG/DL (ref 0–99)
LYMPHOCYTES # BLD AUTO: 2.4 X10E3/UL (ref 0.7–3.1)
LYMPHOCYTES NFR BLD AUTO: 34 %
MCH RBC QN AUTO: 29.3 PG (ref 26.6–33)
MCHC RBC AUTO-ENTMCNC: 33.5 G/DL (ref 31.5–35.7)
MCV RBC AUTO: 88 FL (ref 79–97)
MONOCYTES # BLD AUTO: 0.5 X10E3/UL (ref 0.1–0.9)
MONOCYTES NFR BLD AUTO: 7 %
NEUTROPHILS # BLD AUTO: 3.5 X10E3/UL (ref 1.4–7)
NEUTROPHILS NFR BLD AUTO: 51 %
PLATELET # BLD AUTO: 309 X10E3/UL (ref 150–450)
POTASSIUM SERPL-SCNC: 4.4 MMOL/L (ref 3.5–5.2)
PROT SERPL-MCNC: 7.1 G/DL (ref 6–8.5)
RBC # BLD AUTO: 4.67 X10E6/UL (ref 3.77–5.28)
SODIUM SERPL-SCNC: 140 MMOL/L (ref 134–144)
TRIGL SERPL-MCNC: 109 MG/DL (ref 0–149)
VLDLC SERPL CALC-MCNC: 19 MG/DL (ref 5–40)
WBC # BLD AUTO: 6.9 X10E3/UL (ref 3.4–10.8)

## 2022-05-01 LAB
BACTERIA UR CULT: NO GROWTH
BACTERIA UR CULT: NORMAL

## 2022-05-03 ENCOUNTER — TELEPHONE (OUTPATIENT)
Dept: FAMILY MEDICINE CLINIC | Facility: CLINIC | Age: 37
End: 2022-05-03

## 2022-05-03 NOTE — TELEPHONE ENCOUNTER
[facility]      **[facility]UB[facility] may relay message.        ----- Message from DAYRON Velázquez sent at 5/3/2022  2:55 PM EDT -----  Please inform patient that urine culture is negative for infection.  Cholesterol panel looks good.  Blood sugar is normal.  Kidney function tests are normal.  Electrolytes are normal.  Liver function tests are normal.  CBC is within acceptable limits.  No anemia or infection.

## 2022-05-09 ENCOUNTER — HOSPITAL ENCOUNTER (OUTPATIENT)
Dept: ULTRASOUND IMAGING | Facility: HOSPITAL | Age: 37
Discharge: HOME | End: 2022-05-09
Admitting: NURSE PRACTITIONER

## 2022-05-09 DIAGNOSIS — R19.00 MASS OF SOFT TISSUE OF ABDOMEN: ICD-10-CM

## 2022-05-09 PROCEDURE — 76705 ECHO EXAM OF ABDOMEN: CPT

## 2022-05-11 ENCOUNTER — TELEPHONE (OUTPATIENT)
Dept: FAMILY MEDICINE CLINIC | Facility: CLINIC | Age: 37
End: 2022-05-11

## 2022-05-11 DIAGNOSIS — R19.00 MASS OF SOFT TISSUE OF ABDOMEN: Primary | ICD-10-CM

## 2022-05-11 NOTE — TELEPHONE ENCOUNTER
I read the patient the message and she indicated understanding. She will wait for scheduling to call her to schedule her CT of her abdomen.